# Patient Record
Sex: MALE | URBAN - METROPOLITAN AREA
[De-identification: names, ages, dates, MRNs, and addresses within clinical notes are randomized per-mention and may not be internally consistent; named-entity substitution may affect disease eponyms.]

---

## 2018-01-17 ENCOUNTER — OFFICE VISIT CONVERTED (OUTPATIENT)
Dept: FAMILY MEDICINE CLINIC | Facility: CLINIC | Age: 73
End: 2018-01-17
Attending: NURSE PRACTITIONER

## 2018-01-17 ENCOUNTER — CONVERSION ENCOUNTER (OUTPATIENT)
Dept: FAMILY MEDICINE CLINIC | Facility: CLINIC | Age: 73
End: 2018-01-17

## 2018-08-13 ENCOUNTER — OFFICE VISIT CONVERTED (OUTPATIENT)
Dept: FAMILY MEDICINE CLINIC | Facility: CLINIC | Age: 73
End: 2018-08-13
Attending: PHYSICIAN ASSISTANT

## 2020-09-04 ENCOUNTER — HOSPITAL ENCOUNTER (OUTPATIENT)
Dept: URGENT CARE | Facility: CLINIC | Age: 75
Discharge: HOME | End: 2020-09-04

## 2020-11-05 ENCOUNTER — CONVERSION ENCOUNTER (OUTPATIENT)
Dept: FAMILY MEDICINE CLINIC | Facility: CLINIC | Age: 75
End: 2020-11-05

## 2020-11-05 ENCOUNTER — OFFICE VISIT CONVERTED (OUTPATIENT)
Dept: FAMILY MEDICINE CLINIC | Facility: CLINIC | Age: 75
End: 2020-11-05
Attending: PHYSICIAN ASSISTANT

## 2021-05-13 NOTE — PROGRESS NOTES
Progress Note      Patient Name: John Cee   Patient ID: [id number]   Sex: Male   YOB: 1945    Primary Care Provider: Senthil Santo PA-C   Referring Provider: Senthil Santo PA-C    Visit Date: November 5, 2020    Provider: Senthil Santo PA-C   Location: [facility] [place]   Location Address: [address] [place] [address], [address]  [place], [place]  [id number]   Location Phone: [phone]          Chief Complaint  · routine follow up on medications      History Of Present Illness  John Cee is a 75 year old  male who presents for evaluation and treatment of: routine follow up on medications.      pt presents today for routine follow up on medications.    no new issues or complaints to discuss    Labs 8/18    Patient is a poor historian due to language barrier.  He refuses a phone translation system.  He denies any current issues no chest pain no shortness of breath no headache no flares of his gout.    Patient states he works at [employer].    He denies having a advance directive.    Patient declines pneumonia shot       Past Medical History  Disease Name Date Onset Notes   Essential hypertension 05/31/2017 --    Hyperlipidemia --  --    Hypertension 12/02/2015 --    Impaired fasting glucose 12/02/2015 --          Past Surgical History  Procedure Name Date Notes   *No Past Surgical History --  --          Medication List  Name Date Started Instructions   amlodipine 10 mg oral tablet 10/08/2020 TAKE 1 TABLET (10 MG) BY ORAL ROUTE ONCE DAILY FOR 90 DAYS   atorvastatin 10 mg oral tablet 08/14/2018 take 1 tablet (10 mg) by oral route once daily at bedtime for 90 days   Colcrys 0.6 mg oral tablet 08/13/2018 take 2 tablets (1.2 mg) by oral route initially, then take 1 tab (0.6 mg ) in 1 hr   ramipril 5 mg oral capsule 10/08/2020 TAKE 1 CAPSULE (5 MG) BY ORAL ROUTE ONCE DAILY FOR 90 DAYS         Allergy List  Allergen Name Date Reaction Notes   NO KNOWN DRUG ALLERGIES --  --  --   "      Allergies Reconciled  Family Medical History  Disease Name Relative/Age Notes   -None  --          Social History  Finding Status Start/Stop Quantity Notes   *Denies Alcohol Use --  --/-- --  --    Denies substance abuse Never --/-- --  Denies Fhx of abuse   Exercises regularly --  --/-- --  --     --  --/-- --  lives with wife (pt of Dr Baxter)   No known infection risk --  --/-- --  --    [employer] employee --  --/-- --  --    Tobacco Never --/-- --  --          Immunizations  NameDate Admin Mfg Trade Name Lot Number Route Inj VIS Given VIS Publication   Vmgopbsza49/22/2016 SKB Fluarix, quadrivalent, preservative free 359MH IM LD 12/22/2016 08/07/2015   Comments: tolerated well         Review of Systems  · Constitutional  o Denies  o : fever, fatigue, weight loss, weight gain  · Cardiovascular  o Denies  o : lower extremity edema, claudication, chest pressure, palpitations  · Respiratory  o Denies  o : shortness of breath, wheezing, cough, hemoptysis, dyspnea on exertion  · Gastrointestinal  o Denies  o : nausea, vomiting, diarrhea, constipation, abdominal pain      Vitals  Date Time BP Position Site L\R Cuff Size HR RR TEMP (F) WT  HT  BMI kg/m2 BSA m2 O2 Sat FR L/min FiO2 HC       11/05/2020 09:58 /85 Sitting    71 - R   156lbs 2oz 5'  3\" 27.66 1.77 97 %      11/05/2020 10:00 /85 Sitting                       Physical Examination  · Constitutional  o Appearance  o : well developed, well-nourished, no acute distress  · Head and Face  o Head  o : normocephalic, atraumatic  · Neck  o Inspection/Palpation  o : normal appearance, no masses or tenderness, trachea midline  o Thyroid  o : gland size normal, nontender, no nodules or masses present on palpation  · Respiratory  o Respiratory Effort  o : breathing unlabored  o Inspection of Chest  o : chest rise symmetric bilaterally  o Auscultation of Lungs  o : clear to auscultation bilaterally throughout inspiration and " expiration  · Cardiovascular  o Heart  o :   § Auscultation of Heart  § : regular rate and rhythm, no murmurs, gallops or rubs  o Peripheral Vascular System  o :   § Extremities  § : no edema  · Lymphatic  o Neck  o : no cervical lymphadenopathy, no supraclavicular lymphadenopathy  · Psychiatric  o Mood and Affect  o : mood normal, affect appropriate          Assessment  · Essential hypertension     401.9/I10  · Hyperlipidemia     272.4/E78.5  · Screening for depression     V79.0/Z13.89  · Need for influenza vaccination     V04.81/Z23  · Need for pneumococcal vaccination     V03.82/Z23  · Gout     274.9/M10.9      Plan  · Orders  o Uric Acid (Serum) (76210) - 401.9/I10 - 11/05/2020  o ACO-18: Negative screen for clinical depression using a standardized tool () - V79.0/Z13.89 - 11/05/2020  o ACO-14: Influenza immunization was not administered for reasons documented () - V04.81/Z23 - 11/05/2020   already rcvd vaccine  o Free T4 (30053) - - 11/05/2020  o Male Physical Primary Care Panel (CMP, CBC, TSH, Lipid, PSA) [facility] (84360, 58105, 28476, 68017, 68454, ) - - 11/05/2020  o Urinalysis with Reflex Microscopy ([facility]) (33969) - - 11/05/2020  o Vitamin D (25-Hydroxy) Level (00217) - - 11/05/2020  o ACO-39: Current medications updated and reviewed (, 1159F) - - 11/05/2020  o ACO-13: Fall Risk Screening with no falls in past year or only one fall without injury in the past year (1101F) - - 11/05/2020  o ACO - Pt declines to or was not able to provide an Advance Care Plan or name a Surrogate Decision Maker (1124F) - - 11/06/2020  · Medications  o amlodipine 10 mg oral tablet   SIG: TAKE 1 TABLET (10 MG) BY ORAL ROUTE ONCE DAILY FOR 90 DAYS   DISP: (90) Tablet with 1 refills  Refilled on 11/05/2020     o atorvastatin 10 mg oral tablet   SIG: take 1 tablet (10 mg) by oral route once daily at bedtime for 90 days   DISP: (90) Tablet with 1 refills  Refilled on 11/05/2020     o ramipril 5 mg oral capsule   SIG:  TAKE 1 CAPSULE (5 MG) BY ORAL ROUTE ONCE DAILY FOR 90 DAYS   DISP: (90) Capsule with 1 refills  Refilled on 11/05/2020     o Medications have been Reconciled  o Transition of Care or Provider Policy  · Instructions  o Patient advised to monitor blood pressure (B/P) at home and journal readings. Patient informed that a B/P reading at home of more than 130/80 is considered hypertension. For readings greater apgk225/90 or higher patient is advised to follow up in the office with readings for management. Patient advised to limit sodium intake.  o Patient was educated and given low cholesterol diet information.  o Recommended exercise program to assist with cholesterol, weight loss and overall health improvement.  o Advised that cheeses and other sources of dairy fats, animal fats, fast food, and the extras (candy, pastries, pies, doughnuts and cookies) all contain LDL raising nutrients. Advised to increase fruits, vegetables, whole grains, and to monitor portion sizes.   o Depression Screen completed and scanned into the EMR under the designated folder within the patient's documents.  o Today's PHQ-9 result is _9__  o Take all medications as prescribed/directed.  o Patient instructed/educated on their diet and exercise program.  o Patient was educated/instructed on their diagnosis, treatment and medications prior to discharge from the clinic today.  o Pneumonia vaccine declined.   o Discussed Covid-19 precautions including, but not limited to, social distancing, avoid touching your face, and hand washing.   · Disposition  o Call or Return if symptoms worsen or persist.  o F/U in 6 months  o Care Transition            Electronically Signed by: Senthil Santo PA-C -Author on November 6, 2020 06:23:50 AM

## 2021-05-14 VITALS
DIASTOLIC BLOOD PRESSURE: 85 MMHG | WEIGHT: 156.12 LBS | HEIGHT: 63 IN | DIASTOLIC BLOOD PRESSURE: 85 MMHG | OXYGEN SATURATION: 97 % | BODY MASS INDEX: 27.66 KG/M2 | SYSTOLIC BLOOD PRESSURE: 150 MMHG | HEART RATE: 71 BPM | SYSTOLIC BLOOD PRESSURE: 144 MMHG

## 2021-05-16 VITALS
BODY MASS INDEX: 26.84 KG/M2 | DIASTOLIC BLOOD PRESSURE: 84 MMHG | HEIGHT: 63 IN | HEART RATE: 66 BPM | SYSTOLIC BLOOD PRESSURE: 155 MMHG | WEIGHT: 151.5 LBS

## 2021-05-16 VITALS
BODY MASS INDEX: 28 KG/M2 | HEART RATE: 78 BPM | WEIGHT: 158 LBS | SYSTOLIC BLOOD PRESSURE: 161 MMHG | DIASTOLIC BLOOD PRESSURE: 82 MMHG | SYSTOLIC BLOOD PRESSURE: 140 MMHG | DIASTOLIC BLOOD PRESSURE: 80 MMHG | OXYGEN SATURATION: 98 % | HEIGHT: 63 IN | TEMPERATURE: 97.9 F

## 2021-07-02 DIAGNOSIS — I10 ESSENTIAL HYPERTENSION: Primary | ICD-10-CM

## 2021-07-02 RX ORDER — RAMIPRIL 5 MG/1
CAPSULE ORAL
Qty: 90 CAPSULE | Refills: 1 | Status: SHIPPED | OUTPATIENT
Start: 2021-07-02

## 2021-07-02 RX ORDER — AMLODIPINE BESYLATE 10 MG/1
TABLET ORAL
Qty: 90 TABLET | Refills: 1 | Status: SHIPPED | OUTPATIENT
Start: 2021-07-02